# Patient Record
Sex: MALE | Race: WHITE | NOT HISPANIC OR LATINO | URBAN - METROPOLITAN AREA
[De-identification: names, ages, dates, MRNs, and addresses within clinical notes are randomized per-mention and may not be internally consistent; named-entity substitution may affect disease eponyms.]

---

## 2018-04-04 ENCOUNTER — INPATIENT HOSPITAL (OUTPATIENT)
Dept: URBAN - METROPOLITAN AREA HOSPITAL 76 | Facility: HOSPITAL | Age: 33
End: 2018-04-04
Payer: COMMERCIAL

## 2018-04-04 DIAGNOSIS — K92.1 MELENA: ICD-10-CM

## 2018-04-04 DIAGNOSIS — K44.9 DIAPHRAGMATIC HERNIA WITHOUT OBSTRUCTION OR GANGRENE: ICD-10-CM

## 2018-04-04 DIAGNOSIS — D50.9 IRON DEFICIENCY ANEMIA, UNSPECIFIED: ICD-10-CM

## 2018-04-04 PROCEDURE — 43239 EGD BIOPSY SINGLE/MULTIPLE: CPT | Performed by: INTERNAL MEDICINE

## 2018-04-05 PROCEDURE — 45378 DIAGNOSTIC COLONOSCOPY: CPT | Performed by: INTERNAL MEDICINE

## 2019-08-08 ENCOUNTER — INPATIENT HOSPITAL (OUTPATIENT)
Dept: URBAN - METROPOLITAN AREA HOSPITAL 76 | Facility: HOSPITAL | Age: 34
End: 2019-08-08
Payer: COMMERCIAL

## 2019-08-08 DIAGNOSIS — D50.9 IRON DEFICIENCY ANEMIA, UNSPECIFIED: ICD-10-CM

## 2019-08-08 DIAGNOSIS — K44.9 DIAPHRAGMATIC HERNIA WITHOUT OBSTRUCTION OR GANGRENE: ICD-10-CM

## 2019-08-08 DIAGNOSIS — K31.89 OTHER DISEASES OF STOMACH AND DUODENUM: ICD-10-CM

## 2019-08-08 PROCEDURE — 44360 SMALL BOWEL ENDOSCOPY: CPT | Performed by: INTERNAL MEDICINE

## 2019-08-09 ENCOUNTER — INPATIENT HOSPITAL (OUTPATIENT)
Dept: URBAN - METROPOLITAN AREA HOSPITAL 76 | Facility: HOSPITAL | Age: 34
End: 2019-08-09
Payer: COMMERCIAL

## 2019-08-09 DIAGNOSIS — D64.89 OTHER SPECIFIED ANEMIAS: ICD-10-CM

## 2019-08-09 DIAGNOSIS — R10.9 UNSPECIFIED ABDOMINAL PAIN: ICD-10-CM

## 2019-08-09 PROCEDURE — 99232 SBSQ HOSP IP/OBS MODERATE 35: CPT | Performed by: NURSE PRACTITIONER

## 2019-12-29 NOTE — PROGRESS NOTES
DATE OF CONSULT: 12/30/2019      REQUESTING SOURCE:  Teresa Beal APRN      REASON FOR CONSULTATION: Microcytic anemia, kidney cancer history      HISTORY OF PRESENT ILLNESS:    34-year-old male with medical problem consisting of right kidney cancer diagnosed in April 2018 status post nephrectomy at [facility] in [place], history of recurrent iron deficiency anemia requiring multiple units of PRBC as well as intravenous iron replacement most recent blood transfusion was done in August 2019 at [facility] in [place], history of hernia repair, ventral hernia has been referred to [facility] for further evaluation and recommendation regarding history of right kidney cancer as well as anemia.  Patient states he was diagnosed with kidney cancer on right side in April 2018 when he went to hospital with complaint of fatigue and was found to be severely anemic.  He had a surgery done in April 2018 but he did not follow-up with anyone after surgery secondary to not having any insurance.  Patient does not know any details about what kind of kidney cancer he had and what stage cancer he was in.  Patient complains of fatigue.  Complains of shortness of breath with exertion.  Complains of intermittent palpitation.  Complains of intermittent dizziness.  Complains of chronic lower back pain.  Denies any tingling or numbness affecting upper or lower extremity.  Complains of skin lesion on left ear.  Complains of intermittent abdominal discomfort due to hernia.  Denies any major weight loss.  Denies any new lymph node enlargement.  Denies any family history of sickle cell or thalassemia.        PAST MEDICAL HISTORY:    Past Medical History:   Diagnosis Date   • Anemia    • Hernia, abdominal    • History of blood transfusion    • History of kidney cancer    • Right groin hernia        PAST SURGICAL HISTORY:  Past Surgical History:   Procedure Laterality Date   • HERNIA REPAIR     • NEPHRECTOMY       right       ALLERGIES:    No Known Allergies    SOCIAL HISTORY:   Social History     Tobacco Use   • Smoking status: Current Every Day Smoker   Substance Use Topics   • Alcohol use: Never     Frequency: Never   • Drug use: Not on file       CURRENT MEDICATIONS:    Current Outpatient Medications   Medication Sig Dispense Refill   • Omeprazole (PRILOSEC PO) Take 20 mg by mouth Daily.     • sertraline (ZOLOFT) 25 MG tablet Take 25 mg by mouth.       No current facility-administered medications for this visit.         HOME MEDICATIONS:   Current Outpatient Medications on File Prior to Visit   Medication Sig Dispense Refill   • Omeprazole (PRILOSEC PO) Take 20 mg by mouth Daily.     • sertraline (ZOLOFT) 25 MG tablet Take 25 mg by mouth.       No current facility-administered medications on file prior to visit.        FAMILY HISTORY:    Family History   Problem Relation Age of Onset   • Lung cancer Mother        REVIEW OF SYSTEMS:      CONSTITUTIONAL:  Complains of fatigue. Denies any fever, chills or weight loss.     EYES: No visual disturbances. No discharge. No new lesions    ENMT:  No epistaxis, mouth sores or difficulty swallowing.    RESPIRATORY: Positive for shortness of breath with exertion. No new cough or hemoptysis.    CARDIOVASCULAR: Complains of intermittent chest pain.  Complains of intermittent palpitation.    GASTROINTESTINAL: Complains of abdominal discomfort due to hernia.  No  nausea, vomiting or blood in the stool.    GENITOURINARY: No Dysuria or Hematuria.    MUSCULOSKELETAL: Complains of chronic lower back pain.    LYMPHATICS:  Denies any abnormal swollen glands anywhere in the body.    NEUROLOGICAL : Complains of intermittent dizziness.  No tingling or numbness. No headache . No seizures or balance problems.    SKIN: Complains of skin lesion on left ear.    ENDOCRINE : No new hot or cold intolerance. No new polyuria . No polydipsia.        PHYSICAL EXAMINATION:      VITAL SIGNS:  /94   Pulse  "114   Temp 97.8 °F (36.6 °C)   Ht 168.7 cm (66.4\")   Wt 110 kg (243 lb)   BMI 38.75 kg/m²       12/30/19  1548   Weight: 110 kg (243 lb)       ECOG performance status: 1    CONSTITUTIONAL:  Not in any distress.    EYES: Mild conjunctival Pallor. No Icterus. No Pterygium. Extraocular Movements intact.No ptosis.    ENMT:  Normocephalic, Atraumatic.No Facial Asymmetry noted.    NECK:  No adenopathy.Trachea midline. NO JVD.    RESPIRATORY:  Fair air entry bilateral. No rhonchi or wheezing.Fair respiratory effort.    CARDIOVASCULAR:  S1, S2. Regular rate and rhythm. No murmur or gallop appreciated.    ABDOMEN:  Soft, obese, nontender.  Reducible hernia present in periumbilical region.  Bowel sounds present in all four quadrants.  No Hepatosplenomegaly appreciated.    MUSCULOSKELETAL:  No edema.No Calf Tenderness.Decreased range of motion.    NEUROLOGIC:    No  Motor or sensory deficit appreciated. Cranial Nerves 2-12 grossly intact.    SKIN : Tattoos present. Skin is warm and dry to touch.    LYMPHATICS: No new enlarged lymph nodes in neck or supraclavicular area.    PSYCHIATRY: Alert, awake and oriented ×3.          DIAGNOSTIC DATA:    WBC   Date Value Ref Range Status   12/30/2019 8.44 3.40 - 10.80 10*3/mm3 Final   12/11/2019 7.0 4.0 - 11.0 10*3/uL Final     RBC   Date Value Ref Range Status   12/30/2019 6.47 (H) 4.14 - 5.80 10*6/mm3 Final   12/11/2019 6.06 (H) 4.73 - 5.49 10*6/uL Final     Hemoglobin   Date Value Ref Range Status   12/30/2019 10.0 (L) 13.0 - 17.7 g/dL Final   12/11/2019 9.4 (L) 14.4 - 16.6 g/dL Final     Hematocrit   Date Value Ref Range Status   12/30/2019 36.5 (L) 37.5 - 51.0 % Final   12/11/2019 36.2 (L) 42.9 - 49.1 % Final     MCV   Date Value Ref Range Status   12/30/2019 56.4 (L) 79.0 - 97.0 fL Final   12/11/2019 59.7 (L) 85.0 - 95.0 fL Final     MCH   Date Value Ref Range Status   12/30/2019 15.5 (L) 26.6 - 33.0 pg Final   12/11/2019 15.5 (L) 28.0 - 32.0 pg Final     MCHC   Date Value Ref " Range Status   12/30/2019 27.4 (L) 31.5 - 35.7 g/dL Final   12/11/2019 26.0 (L) 32.0 - 34.0 g/dL Final     RDW   Date Value Ref Range Status   12/30/2019 21.9 (H) 12.3 - 15.4 % Final   12/11/2019 38.0 (H) 11.5 - 14.5 % Final     RDW-SD   Date Value Ref Range Status   12/30/2019 37.7 37.0 - 54.0 fl Final     MPV   Date Value Ref Range Status   12/30/2019   Final     Comment:     instrument unable to calculate      Platelets   Date Value Ref Range Status   12/30/2019 232 140 - 450 10*3/mm3 Final   12/11/2019 241 150 - 450 10*3/uL Final     Comment:     Performed at [facility], [address] [place] [address]     Neutrophils Absolute   Date Value Ref Range Status   12/30/2019 5.49 1.70 - 7.00 10*3/mm3 Final     Basophils Absolute   Date Value Ref Range Status   12/30/2019 0.08 0.00 - 0.20 10*3/mm3 Final     Glucose   Date Value Ref Range Status   12/30/2019 100 (H) 65 - 99 mg/dL Final     Sodium   Date Value Ref Range Status   12/30/2019 138 136 - 145 mmol/L Final     Potassium   Date Value Ref Range Status   12/30/2019 3.8 3.5 - 5.2 mmol/L Final     CO2   Date Value Ref Range Status   12/30/2019 28.0 22.0 - 29.0 mmol/L Final     Chloride   Date Value Ref Range Status   12/30/2019 99 98 - 107 mmol/L Final     Anion Gap   Date Value Ref Range Status   12/30/2019 11.0 5.0 - 15.0 mmol/L Final     Creatinine   Date Value Ref Range Status   12/30/2019 1.11 0.76 - 1.27 mg/dL Final     BUN   Date Value Ref Range Status   12/30/2019 17 6 - 20 mg/dL Final     BUN/Creatinine Ratio   Date Value Ref Range Status   12/30/2019 15.3 7.0 - 25.0 Final     Calcium   Date Value Ref Range Status   12/30/2019 9.5 8.6 - 10.5 mg/dL Final     eGFR Non  Amer   Date Value Ref Range Status   12/30/2019 76 >60 mL/min/1.73 Final     Alkaline Phosphatase   Date Value Ref Range Status   12/30/2019 97 39 - 117 U/L Final     Total Protein   Date Value Ref Range Status   12/30/2019 7.9 6.0 - 8.5 g/dL Final     ALT  (SGPT)   Date Value Ref Range Status   12/30/2019 12 1 - 41 U/L Final     AST (SGOT)   Date Value Ref Range Status   12/30/2019 12 1 - 40 U/L Final     Total Bilirubin   Date Value Ref Range Status   12/30/2019 0.4 0.2 - 1.2 mg/dL Final     Albumin   Date Value Ref Range Status   12/30/2019 4.50 3.50 - 5.20 g/dL Final     Globulin   Date Value Ref Range Status   12/30/2019 3.4 gm/dL Final     Lab Results   Component Value Date    IRON 20 (L) 12/30/2019    TIBC 651 (H) 12/30/2019    LABIRON 3 (L) 12/30/2019    FERRITIN 2.13 (L) 12/30/2019     No results found for: , LABCA2, AFPTM, HCGQUANT, , CHROMGRNA, 9ASMG20MTH, CEA, REFLABREPO]              RADIOLOGY DATA:  CT of abdomen and pelvis with contrast done on August 7, 2019 at [facility] in [place] showed:          CT of chest with contrast done on August 9, 2019 at [facility] in [place] showed:            ASSESSMENT AND PLAN:      1.  Microcytic anemia:  - Patient is found to have hemoglobin of 9.4 with MCV of 59 on December 11, 2019.  - Patient does have a history of GI blood loss as per records from [facility] in [place].  Patient had a EGD, colonoscopy and capsule endoscopy done in August 2019 that showed erosions and Ras lesions.  There was no active bleeding.  -We will do blood work today consisting of CBC with differential, iron studies, B12, ferritin, folate reticulocyte count,.  - Anemia work-up done on December 30, 2019 shows hemoglobin of 10 with MCV of 56.  Iron studies are consistent with severe iron deficiency with ferritin of only 2 and TIBC of 651.  - Due to patient's symptoms with severe dizziness we will start patient on intravenous Injectafer starting later this week.  -We will also start patient on ferrous sulfate 1 tablet p.o. Daily.  Will also start patient on B12 and folic acid 1 tablet 3 times a week.  -Patient has been referred to gastroenterology team primary medical provider for  evaluation of severe microcytic anemia.  - If after correction of iron studies, patient continues to have microcytosis, patient will need evaluation for thalassemia.  First will need to correct iron studies prior to doing any electrophoresis for hemoglobinopathy.    2.  Right kidney cancer:  - Patient was diagnosed with kidney cancer on right side in April 2018 at [facility] in [place].  - We will try to obtain pathology records from [facility] for further details on kidney cancer.  - Patient states he never followed up with anyone after surgery due to insurance issue.  Patient does not know what stage and what kind of kidney cancer he had.  - Surveillance CT of chest, abdomen and pelvis with contrast done in August 2019 at [facility] did not show any evidence of recurrence or metastasis.  -His next surveillance CT scan will be done around April 2020.      3.  Health maintenance: Patient smokes about half pack per day, he was counseled about smoking cessation.  About 5 minutes were spent for smoking cessation counseling.  Had a EGD, colonoscopy and capsule endoscopy done in August 2019 at [facility] in [place].    4.  Pain assessment:  -Patient denies any pain today.    5.  Prescriptions:  -Prescription for ferrous sulfate, Colace, B12 and folic acid has been sent to his pharmacy today.        Records were obtained and reviewed from [facility] for preparation of this clinic visit.      Thank you for this consultation.    Lux Gray MD  12/30/2019  6:46 PM          EMR Dragon/Transcription disclaimer:   Much of this encounter note is an electronic transcription/translation of spoken language to printed text. The electronic translation of spoken language may permit erroneous, or at times, nonsensical words or phrases to be inadvertently transcribed; Although I have reviewed the note for such errors, some may still exist.

## 2019-12-30 ENCOUNTER — DOCUMENTATION (OUTPATIENT)
Dept: ONCOLOGY | Facility: CLINIC | Age: 34
End: 2019-12-30

## 2019-12-30 ENCOUNTER — CONSULT (OUTPATIENT)
Dept: ONCOLOGY | Facility: CLINIC | Age: 34
End: 2019-12-30

## 2019-12-30 ENCOUNTER — APPOINTMENT (OUTPATIENT)
Dept: ONCOLOGY | Facility: HOSPITAL | Age: 34
End: 2019-12-30

## 2019-12-30 VITALS
SYSTOLIC BLOOD PRESSURE: 163 MMHG | HEART RATE: 114 BPM | WEIGHT: 243 LBS | BODY MASS INDEX: 39.05 KG/M2 | TEMPERATURE: 97.8 F | DIASTOLIC BLOOD PRESSURE: 94 MMHG | HEIGHT: 66 IN

## 2019-12-30 DIAGNOSIS — C64.1 CANCER OF RIGHT KIDNEY (HCC): ICD-10-CM

## 2019-12-30 DIAGNOSIS — D64.9 ANEMIA, UNSPECIFIED TYPE: ICD-10-CM

## 2019-12-30 DIAGNOSIS — C64.1 CANCER OF RIGHT KIDNEY (HCC): Primary | ICD-10-CM

## 2019-12-30 LAB
ALBUMIN SERPL-MCNC: 4.5 G/DL (ref 3.5–5.2)
ALBUMIN/GLOB SERPL: 1.3 G/DL
ALP SERPL-CCNC: 97 U/L (ref 39–117)
ALT SERPL W P-5'-P-CCNC: 12 U/L (ref 1–41)
ANION GAP SERPL CALCULATED.3IONS-SCNC: 11 MMOL/L (ref 5–15)
ANISOCYTOSIS BLD QL: NORMAL
AST SERPL-CCNC: 12 U/L (ref 1–40)
BASOPHILS # BLD MANUAL: 0.08 10*3/MM3 (ref 0–0.2)
BASOPHILS NFR BLD AUTO: 1 % (ref 0–1.5)
BILIRUB SERPL-MCNC: 0.4 MG/DL (ref 0.2–1.2)
BUN BLD-MCNC: 17 MG/DL (ref 6–20)
BUN/CREAT SERPL: 15.3 (ref 7–25)
CALCIUM SPEC-SCNC: 9.5 MG/DL (ref 8.6–10.5)
CHLORIDE SERPL-SCNC: 99 MMOL/L (ref 98–107)
CO2 SERPL-SCNC: 28 MMOL/L (ref 22–29)
CREAT BLD-MCNC: 1.11 MG/DL (ref 0.76–1.27)
DEPRECATED RDW RBC AUTO: 37.7 FL (ref 37–54)
ERYTHROCYTE [DISTWIDTH] IN BLOOD BY AUTOMATED COUNT: 21.9 % (ref 12.3–15.4)
FERRITIN SERPL-MCNC: 2.13 NG/ML (ref 30–400)
GFR SERPL CREATININE-BSD FRML MDRD: 76 ML/MIN/1.73
GLOBULIN UR ELPH-MCNC: 3.4 GM/DL
GLUCOSE BLD-MCNC: 100 MG/DL (ref 65–99)
HCT VFR BLD AUTO: 36.5 % (ref 37.5–51)
HGB BLD-MCNC: 10 G/DL (ref 13–17.7)
IRON 24H UR-MRATE: 20 MCG/DL (ref 59–158)
IRON SATN MFR SERPL: 3 % (ref 20–50)
LDH SERPL-CCNC: 82 U/L (ref 135–225)
LYMPHOCYTES # BLD MANUAL: 2.28 10*3/MM3 (ref 0.7–3.1)
LYMPHOCYTES NFR BLD MANUAL: 27 % (ref 19.6–45.3)
LYMPHOCYTES NFR BLD MANUAL: 7 % (ref 5–12)
MCH RBC QN AUTO: 15.5 PG (ref 26.6–33)
MCHC RBC AUTO-ENTMCNC: 27.4 G/DL (ref 31.5–35.7)
MCV RBC AUTO: 56.4 FL (ref 79–97)
MICROCYTES BLD QL: NORMAL
MONOCYTES # BLD AUTO: 0.59 10*3/MM3 (ref 0.1–0.9)
NEUTROPHILS # BLD AUTO: 5.49 10*3/MM3 (ref 1.7–7)
NEUTROPHILS NFR BLD MANUAL: 65 % (ref 42.7–76)
OVALOCYTES BLD QL SMEAR: NORMAL
PLATELET # BLD AUTO: 232 10*3/MM3 (ref 140–450)
PMV BLD AUTO: ABNORMAL FL
POIKILOCYTOSIS BLD QL SMEAR: NORMAL
POLYCHROMASIA BLD QL SMEAR: NORMAL
POTASSIUM BLD-SCNC: 3.8 MMOL/L (ref 3.5–5.2)
PROT SERPL-MCNC: 7.9 G/DL (ref 6–8.5)
RBC # BLD AUTO: 6.47 10*6/MM3 (ref 4.14–5.8)
SMALL PLATELETS BLD QL SMEAR: ADEQUATE
SODIUM BLD-SCNC: 138 MMOL/L (ref 136–145)
TIBC SERPL-MCNC: 651 MCG/DL (ref 298–536)
TRANSFERRIN SERPL-MCNC: 437 MG/DL (ref 200–360)
WBC MORPH BLD: NORMAL
WBC NRBC COR # BLD: 8.44 10*3/MM3 (ref 3.4–10.8)

## 2019-12-30 PROCEDURE — 85025 COMPLETE CBC W/AUTO DIFF WBC: CPT | Performed by: INTERNAL MEDICINE

## 2019-12-30 PROCEDURE — 82728 ASSAY OF FERRITIN: CPT | Performed by: INTERNAL MEDICINE

## 2019-12-30 PROCEDURE — 83615 LACTATE (LD) (LDH) ENZYME: CPT | Performed by: INTERNAL MEDICINE

## 2019-12-30 PROCEDURE — G9902 PT SCRN TBCO AND ID AS USER: HCPCS | Performed by: INTERNAL MEDICINE

## 2019-12-30 PROCEDURE — G8731 PAIN NEG NO PLAN: HCPCS | Performed by: INTERNAL MEDICINE

## 2019-12-30 PROCEDURE — 82746 ASSAY OF FOLIC ACID SERUM: CPT | Performed by: INTERNAL MEDICINE

## 2019-12-30 PROCEDURE — G0463 HOSPITAL OUTPT CLINIC VISIT: HCPCS | Performed by: INTERNAL MEDICINE

## 2019-12-30 PROCEDURE — 80053 COMPREHEN METABOLIC PANEL: CPT | Performed by: INTERNAL MEDICINE

## 2019-12-30 PROCEDURE — 99204 OFFICE O/P NEW MOD 45 MIN: CPT | Performed by: INTERNAL MEDICINE

## 2019-12-30 PROCEDURE — 82607 VITAMIN B-12: CPT | Performed by: INTERNAL MEDICINE

## 2019-12-30 PROCEDURE — 83540 ASSAY OF IRON: CPT | Performed by: INTERNAL MEDICINE

## 2019-12-30 PROCEDURE — 84466 ASSAY OF TRANSFERRIN: CPT | Performed by: INTERNAL MEDICINE

## 2019-12-30 PROCEDURE — 85007 BL SMEAR W/DIFF WBC COUNT: CPT | Performed by: INTERNAL MEDICINE

## 2019-12-30 RX ORDER — DOCUSATE SODIUM 100 MG/1
100 CAPSULE, LIQUID FILLED ORAL 2 TIMES DAILY PRN
Qty: 60 CAPSULE | Refills: 2 | Status: SHIPPED | OUTPATIENT
Start: 2019-12-30

## 2019-12-30 RX ORDER — SERTRALINE HYDROCHLORIDE 25 MG/1
25 TABLET, FILM COATED ORAL
COMMUNITY
Start: 2019-12-11

## 2019-12-30 RX ORDER — SODIUM CHLORIDE 9 MG/ML
250 INJECTION, SOLUTION INTRAVENOUS ONCE
Status: CANCELLED | OUTPATIENT
Start: 2020-01-03

## 2019-12-30 RX ORDER — DIPHENHYDRAMINE HYDROCHLORIDE 50 MG/ML
50 INJECTION INTRAMUSCULAR; INTRAVENOUS AS NEEDED
Status: CANCELLED | OUTPATIENT
Start: 2020-01-03

## 2019-12-30 RX ORDER — FERROUS SULFATE 325(65) MG
325 TABLET ORAL
Qty: 30 TABLET | Refills: 3 | Status: SHIPPED | OUTPATIENT
Start: 2019-12-30

## 2019-12-30 NOTE — PROGRESS NOTES
New patient consultation. Medical oncology.  Distress Screening and PHQ-9 completed during this visit. Pt. Scored #4 on distress screening, marking indicators of stress related to family health issues and feelings of sadness.  Pt. With history of kidney cancer (2018) and anemia. Pt. Has received care in [place] prior to moving to [facility]. Pt presents today to establish continued oncology management.  Pt. Is accompanied by his sister and her elementary age son.  Sister serves as primary historian. She shared family history significant for cancer to include, mother, herself, and her own so who  of brain cancer.   Pt. Presents quiet and calm and looks to sister for feedback.  She states they recently moved back to this area.  Pt with self reported history of anxiety and depression, he states no needs or issues on this day in regard to SW support.  SW provided feedback as to role and contact info given.  SW offered continued support and availability.  [employer] book for children given to pt nephew as resource. Pt/family responded receptive to SW encounter and feedback

## 2019-12-31 ENCOUNTER — TELEPHONE (OUTPATIENT)
Dept: ONCOLOGY | Facility: HOSPITAL | Age: 34
End: 2019-12-31

## 2019-12-31 LAB
FOLATE SERPL-MCNC: 16.9 NG/ML (ref 4.78–24.2)
VIT B12 BLD-MCNC: 602 PG/ML (ref 211–946)

## 2019-12-31 RX ORDER — LANOLIN ALCOHOL/MO/W.PET/CERES
CREAM (GRAM) TOPICAL
Qty: 36 TABLET | Refills: 1 | Status: SHIPPED | OUTPATIENT
Start: 2019-12-31

## 2019-12-31 RX ORDER — FOLIC ACID 1 MG/1
TABLET ORAL
Qty: 36 TABLET | Refills: 1 | Status: SHIPPED | OUTPATIENT
Start: 2019-12-31

## 2020-01-02 ENCOUNTER — DOCUMENTATION (OUTPATIENT)
Dept: NUTRITION | Facility: HOSPITAL | Age: 35
End: 2020-01-02

## 2020-01-02 NOTE — PROGRESS NOTES
Adult Outpatient Nutrition  Assessment    Patient Name:  Lisandro Huang  YOB: 1985  MRN: [id number]    Screen Date:  1/2/2020    Comments: Chart review revealed 34M coming to [facility] due to hx R kidney cancer (nephrectomy in [place]). Anemia. Work-up in progress. Ht 66 in. Wt 243 lb. BMI 38.8. Alb 4.5. No immediate nutrition related problems noted at present.                        Electronically signed by:  Jerrica Patterson RD  01/02/20 1:33 PM

## 2020-01-06 ENCOUNTER — APPOINTMENT (OUTPATIENT)
Dept: ONCOLOGY | Facility: HOSPITAL | Age: 35
End: 2020-01-06

## 2020-01-07 ENCOUNTER — DOCUMENTATION (OUTPATIENT)
Dept: ONCOLOGY | Facility: CLINIC | Age: 35
End: 2020-01-07

## 2020-01-07 NOTE — PROGRESS NOTES
Per Dr. Gray's documentation, pt is under his care for anemia. History of kidney cancer without signs or symptoms of recurrence currently. No role for oncology nurse navigator in plan of care for this pt currently.

## 2020-01-13 ENCOUNTER — APPOINTMENT (OUTPATIENT)
Dept: ONCOLOGY | Facility: HOSPITAL | Age: 35
End: 2020-01-13

## 2020-02-04 ENCOUNTER — TELEPHONE (OUTPATIENT)
Dept: ONCOLOGY | Facility: CLINIC | Age: 35
End: 2020-02-04

## 2020-02-04 NOTE — TELEPHONE ENCOUNTER
His iron level was negligible.  Without infusion I do not expect it to get any better.  Recommend getting iron infusions done first before rechecking his iron level.  Thank you

## 2020-02-04 NOTE — TELEPHONE ENCOUNTER
----- Message from Yazmin Ruggiero sent at 2/4/2020 10:50 AM CST -----  Contact: sister  Pt's sister called and rescheduled pt's iron infusion.  She is wanting to know if we can do labs before hand to get a better look at his levels.

## 2020-02-04 NOTE — TELEPHONE ENCOUNTER
Pts sister called need to cancel and reschedule 2/24 appt and to reschedule an  iron infusion he missed in January.    Morning from 9-11 is best for patient    [phone]

## 2020-02-10 ENCOUNTER — INFUSION (OUTPATIENT)
Dept: PEDIATRICS | Facility: HOSPITAL | Age: 35
End: 2020-02-10

## 2020-02-10 VITALS
TEMPERATURE: 98.1 F | RESPIRATION RATE: 20 BRPM | HEART RATE: 108 BPM | DIASTOLIC BLOOD PRESSURE: 85 MMHG | SYSTOLIC BLOOD PRESSURE: 136 MMHG

## 2020-02-10 DIAGNOSIS — D50.0 IRON DEFICIENCY ANEMIA DUE TO CHRONIC BLOOD LOSS: Primary | ICD-10-CM

## 2020-02-10 PROCEDURE — 25010000002 FERRIC CARBOXYMALTOSE 750 MG/15ML SOLUTION 15 ML VIAL: Performed by: INTERNAL MEDICINE

## 2020-02-10 PROCEDURE — 96374 THER/PROPH/DIAG INJ IV PUSH: CPT | Performed by: INTERNAL MEDICINE

## 2020-02-10 RX ORDER — DIPHENHYDRAMINE HYDROCHLORIDE 50 MG/ML
50 INJECTION INTRAMUSCULAR; INTRAVENOUS AS NEEDED
Status: CANCELLED | OUTPATIENT
Start: 2020-02-17

## 2020-02-10 RX ORDER — SODIUM CHLORIDE 9 MG/ML
250 INJECTION, SOLUTION INTRAVENOUS ONCE
Status: COMPLETED | OUTPATIENT
Start: 2020-02-10 | End: 2020-02-10

## 2020-02-10 RX ORDER — SODIUM CHLORIDE 9 MG/ML
250 INJECTION, SOLUTION INTRAVENOUS ONCE
Status: CANCELLED | OUTPATIENT
Start: 2020-02-17

## 2020-02-10 RX ORDER — METHYLPREDNISOLONE SODIUM SUCCINATE 125 MG/2ML
125 INJECTION, POWDER, LYOPHILIZED, FOR SOLUTION INTRAMUSCULAR; INTRAVENOUS AS NEEDED
Status: CANCELLED | OUTPATIENT
Start: 2020-02-17

## 2020-02-10 RX ADMIN — FERRIC CARBOXYMALTOSE INJECTION 750 MG: 50 INJECTION, SOLUTION INTRAVENOUS at 15:31

## 2020-02-10 RX ADMIN — SODIUM CHLORIDE 250 ML: 9 INJECTION, SOLUTION INTRAVENOUS at 15:31

## 2020-02-17 ENCOUNTER — INFUSION (OUTPATIENT)
Dept: PEDIATRICS | Facility: HOSPITAL | Age: 35
End: 2020-02-17

## 2020-02-17 ENCOUNTER — TELEPHONE (OUTPATIENT)
Dept: ONCOLOGY | Facility: CLINIC | Age: 35
End: 2020-02-17

## 2020-02-17 VITALS — TEMPERATURE: 98.8 F | HEART RATE: 101 BPM | SYSTOLIC BLOOD PRESSURE: 143 MMHG | DIASTOLIC BLOOD PRESSURE: 76 MMHG

## 2020-02-17 DIAGNOSIS — D50.0 IRON DEFICIENCY ANEMIA DUE TO CHRONIC BLOOD LOSS: Primary | ICD-10-CM

## 2020-02-17 PROCEDURE — 96374 THER/PROPH/DIAG INJ IV PUSH: CPT | Performed by: INTERNAL MEDICINE

## 2020-02-17 PROCEDURE — 25010000002 FERRIC CARBOXYMALTOSE 750 MG/15ML SOLUTION 15 ML VIAL: Performed by: INTERNAL MEDICINE

## 2020-02-17 RX ORDER — SODIUM CHLORIDE 9 MG/ML
250 INJECTION, SOLUTION INTRAVENOUS ONCE
Status: CANCELLED | OUTPATIENT
Start: 2020-02-24

## 2020-02-17 RX ORDER — DIPHENHYDRAMINE HYDROCHLORIDE 50 MG/ML
50 INJECTION INTRAMUSCULAR; INTRAVENOUS AS NEEDED
OUTPATIENT
Start: 2020-02-24

## 2020-02-17 RX ORDER — METHYLPREDNISOLONE SODIUM SUCCINATE 125 MG/2ML
125 INJECTION, POWDER, LYOPHILIZED, FOR SOLUTION INTRAMUSCULAR; INTRAVENOUS AS NEEDED
OUTPATIENT
Start: 2020-02-24

## 2020-02-17 RX ORDER — SODIUM CHLORIDE 9 MG/ML
250 INJECTION, SOLUTION INTRAVENOUS ONCE
Status: COMPLETED | OUTPATIENT
Start: 2020-02-17 | End: 2020-02-17

## 2020-02-17 RX ADMIN — SODIUM CHLORIDE 250 ML: 9 INJECTION, SOLUTION INTRAVENOUS at 15:07

## 2020-02-17 RX ADMIN — FERRIC CARBOXYMALTOSE INJECTION 750 MG: 50 INJECTION, SOLUTION INTRAVENOUS at 15:07

## 2020-02-17 NOTE — TELEPHONE ENCOUNTER
Spoke with patient's sister--agreed 2:30 pm infusion time for today.  Lindsey Meza RN  February 17, 2020  1:32 PM

## 2020-06-24 RX ORDER — FERROUS SULFATE 325(65) MG
TABLET ORAL
Qty: 30 TABLET | Refills: 3 | OUTPATIENT
Start: 2020-06-24

## 2020-06-24 NOTE — TELEPHONE ENCOUNTER
Meme Carballo called pt to schedule an appt. Pt did not answer. Message was left for pt to call back.